# Patient Record
Sex: FEMALE | Race: WHITE | NOT HISPANIC OR LATINO | URBAN - METROPOLITAN AREA
[De-identification: names, ages, dates, MRNs, and addresses within clinical notes are randomized per-mention and may not be internally consistent; named-entity substitution may affect disease eponyms.]

---

## 2021-06-10 PROBLEM — N94.6 DYSMENORRHEA: Status: ACTIVE | Noted: 2021-06-10

## 2021-06-10 PROBLEM — N84.0 ENDOMETRIAL POLYP: Status: ACTIVE | Noted: 2021-06-10

## 2021-06-10 PROBLEM — Z80.3 FAMILY HISTORY OF BREAST CANCER: Status: ACTIVE | Noted: 2021-06-10

## 2021-06-10 PROBLEM — N92.0 MENORRHAGIA: Status: ACTIVE | Noted: 2021-06-10

## 2021-09-22 ENCOUNTER — VBI (OUTPATIENT)
Dept: ADMINISTRATIVE | Facility: OTHER | Age: 41
End: 2021-09-22

## 2021-09-22 NOTE — TELEPHONE ENCOUNTER
Upon review of the In Basket request we were able to locate, review, and update the patient chart as requested for Mammogram     Any additional questions or concerns should be emailed to the Practice Liaisons via [email or web address] email, please do not reply via In Basket      Thank you  Jeremi Kennedy

## 2023-10-26 PROBLEM — Z01.419 ENCOUNTER FOR GYNECOLOGICAL EXAMINATION (GENERAL) (ROUTINE) WITHOUT ABNORMAL FINDINGS: Status: ACTIVE | Noted: 2023-10-26

## 2023-10-26 NOTE — PROGRESS NOTES
Assessment/Plan:    Encounter for gynecological examination (general) (routine) without abnormal findings  Here for well check, no complaints. Last seen 2021. Lost 50 lbs with diet and exercise, feels great. Same partner x 15 yrs. Cycles for 7 days, 2 days heavy/dysmenorrhea. No further IMB. Normal breast and pelvic exams. Last pap 10/2020 neg/HPV neg; repeated today  Mammo order given, last 7/3/23 reviewed on [employer]  Will RTO 6 months for breast exam.   Referral given for f/h breast cancer in mother @35. Endometrial polyp  Dx 2020 on u/s after IMB. Decided not to have D&C. Since that time no further bleeding issues. Declines follow up at this time. Diagnoses and all orders for this visit:    Encounter for gynecological examination (general) (routine) without abnormal findings    Family history of breast cancer  -     Mammo screening bilateral w 3d & cad; Future  -     Ambulatory Referral to Oncology Genetics; Future    Endometrial polyp    Encounter for screening mammogram for malignant neoplasm of breast  -     Mammo screening bilateral w 3d & cad; Future          Subjective:      Patient ID: Molly Floyd is a 37 y.o. female. HPI Here for well check. The following portions of the patient's history were reviewed and updated as appropriate: She  has a past medical history of Anxiety, Depression, Genital warts (2010), History of pelvic ultrasound (12/08/2020), History of screening mammography (08/2023), HPV in female (2010), Papanicolaou smear (10/29/2020), and Seasonal allergies. She   Patient Active Problem List    Diagnosis Date Noted    Encounter for gynecological examination (general) (routine) without abnormal findings 10/26/2023    Endometrial polyp 06/10/2021    Family history of breast cancer 06/10/2021     She  has a past surgical history that includes Septoplasty (2008); Tonsillectomy (1988); and Mammo (historical) (07/03/2023).   Her family history includes Breast cancer in her mother; Lung cancer in her maternal grandfather; No Known Problems in her maternal aunt, maternal aunt, maternal grandmother, maternal uncle, and maternal uncle. She  reports that she has never smoked. She has never used smokeless tobacco. She reports current alcohol use. She reports that she does not currently use drugs. No current outpatient medications on file. No current facility-administered medications for this visit. She has No Known Allergies. .    Review of Systems  No breast, bladder, bowel changes.  No new persistent pain, bloating, early satiety or pelvic pressure  No significant menorrhagia, dysmenorrhea, no intermenstrual bleeding      Objective:      /64   Ht 5' 10.75" (1.797 m)   Wt 77.6 kg (171 lb)   LMP 10/20/2023 (Exact Date)   Breastfeeding No   BMI 24.02 kg/m²          Physical Exam    General appearance: no distress, pleasant  Neck: thyroid without nodules or thyromegaly, no palpable adenopathy  Lymph nodes: no palpable adenopathy  Breasts: no masses, nodes or skin changes, dense islands bilaterally  Abdomen: soft, non tender, no palpable masses  Pelvic exam: normal external genitalia, urethral meatus normal, vagina without lesions, cervix without lesions, RV uterus small, non tender, no adnexal masses, non tender  Rectal exam: deferred

## 2023-10-31 ENCOUNTER — ANNUAL EXAM (OUTPATIENT)
Dept: OBGYN CLINIC | Facility: CLINIC | Age: 43
End: 2023-10-31

## 2023-10-31 VITALS
DIASTOLIC BLOOD PRESSURE: 64 MMHG | WEIGHT: 171 LBS | HEIGHT: 71 IN | SYSTOLIC BLOOD PRESSURE: 118 MMHG | BODY MASS INDEX: 23.94 KG/M2

## 2023-10-31 DIAGNOSIS — Z12.4 SCREENING FOR MALIGNANT NEOPLASM OF THE CERVIX: ICD-10-CM

## 2023-10-31 DIAGNOSIS — Z12.31 ENCOUNTER FOR SCREENING MAMMOGRAM FOR MALIGNANT NEOPLASM OF BREAST: ICD-10-CM

## 2023-10-31 DIAGNOSIS — Z80.3 FAMILY HISTORY OF BREAST CANCER: ICD-10-CM

## 2023-10-31 DIAGNOSIS — N84.0 ENDOMETRIAL POLYP: ICD-10-CM

## 2023-10-31 DIAGNOSIS — Z01.419 ENCOUNTER FOR GYNECOLOGICAL EXAMINATION (GENERAL) (ROUTINE) WITHOUT ABNORMAL FINDINGS: Primary | ICD-10-CM

## 2023-10-31 PROBLEM — N94.6 DYSMENORRHEA: Status: RESOLVED | Noted: 2021-06-10 | Resolved: 2023-10-31

## 2023-10-31 PROBLEM — N92.0 MENORRHAGIA: Status: RESOLVED | Noted: 2021-06-10 | Resolved: 2023-10-31

## 2023-10-31 NOTE — PATIENT INSTRUCTIONS
Call for your appointment with genetics at [phone]. Return to office in six months unless having any problems such as breast changes, bleeding, new persistent pain, new progressive bloating, new problems eating (getting full to quickly) or new constant urinary pressure that does not resolve in one week.

## 2023-10-31 NOTE — ASSESSMENT & PLAN NOTE
Here for well check, no complaints. Last seen 2021. Lost 50 lbs with diet and exercise, feels great. Same partner x 15 yrs. Cycles for 7 days, 2 days heavy/dysmenorrhea. No further IMB. Normal breast and pelvic exams. Last pap 10/2020 neg/HPV neg; repeated today  Mammo order given, last 7/3/23 reviewed on [email or web address]  Will RTO 6 months for breast exam.   Referral given for f/h breast cancer in mother @35.

## 2023-10-31 NOTE — ASSESSMENT & PLAN NOTE
Dx 2020 on u/s after IMB. Decided not to have D&C. Since that time no further bleeding issues. Declines follow up at this time.

## 2023-10-31 NOTE — LETTER
October 31, 2023     DO Leidy Judd  [address]    Patient: Keyur Ames   YOB: 1980   Date of Visit: 10/31/2023       Dear Dr. Suzanne Oh:    Althea Koyanagi was in today for her annual gyn exam.  Below are my notes for this consultation. If you have questions, please do not hesitate to call me. I look forward to following your patient along with you. Sincerely,        Mary Monroe MD        CC: No Recipients    Mary Monroe MD  10/31/2023 11:37 AM  Sign when Signing Visit  Assessment/Plan:    Encounter for gynecological examination (general) (routine) without abnormal findings  Here for well check, no complaints. Last seen 2021. Lost 50 lbs with diet and exercise, feels great. Same partner x 15 yrs. Cycles for 7 days, 2 days heavy/dysmenorrhea. No further IMB. Normal breast and pelvic exams. Last pap 10/2020 neg/HPV neg; repeated today  Mammo order given, last 7/3/23 reviewed on [employer]  Will RTO 6 months for breast exam.   Referral given for f/h breast cancer in mother @35. Endometrial polyp  Dx 2020 on u/s after IMB. Decided not to have D&C. Since that time no further bleeding issues. Declines follow up at this time. Diagnoses and all orders for this visit:    Encounter for gynecological examination (general) (routine) without abnormal findings    Family history of breast cancer  -     Mammo screening bilateral w 3d & cad; Future  -     Ambulatory Referral to Oncology Genetics; Future    Endometrial polyp    Encounter for screening mammogram for malignant neoplasm of breast  -     Mammo screening bilateral w 3d & cad; Future          Subjective:      Patient ID: Keyur Ames is a 37 y.o. female. HPI Here for well check.       The following portions of the patient's history were reviewed and updated as appropriate: She  has a past medical history of Anxiety, Depression, Genital warts (2010), History of pelvic ultrasound (12/08/2020), History of screening mammography (08/2023), HPV in female (2010), Papanicolaou smear (10/29/2020), and Seasonal allergies. She   Patient Active Problem List    Diagnosis Date Noted   • Encounter for gynecological examination (general) (routine) without abnormal findings 10/26/2023   • Endometrial polyp 06/10/2021   • Family history of breast cancer 06/10/2021     She  has a past surgical history that includes Septoplasty (2008); Tonsillectomy (1988); and Mammo (historical) (07/03/2023). Her family history includes Breast cancer in her mother; Lung cancer in her maternal grandfather; No Known Problems in her maternal aunt, maternal aunt, maternal grandmother, maternal uncle, and maternal uncle. She  reports that she has never smoked. She has never used smokeless tobacco. She reports current alcohol use. She reports that she does not currently use drugs. No current outpatient medications on file. No current facility-administered medications for this visit. She has No Known Allergies. .    Review of Systems  No breast, bladder, bowel changes.  No new persistent pain, bloating, early satiety or pelvic pressure  No significant menorrhagia, dysmenorrhea, no intermenstrual bleeding      Objective:      /64   Ht 5' 10.75" (1.797 m)   Wt 77.6 kg (171 lb)   LMP 10/20/2023 (Exact Date)   Breastfeeding No   BMI 24.02 kg/m²          Physical Exam    General appearance: no distress, pleasant  Neck: thyroid without nodules or thyromegaly, no palpable adenopathy  Lymph nodes: no palpable adenopathy  Breasts: no masses, nodes or skin changes, dense islands bilaterally  Abdomen: soft, non tender, no palpable masses  Pelvic exam: normal external genitalia, urethral meatus normal, vagina without lesions, cervix without lesions, RV uterus small, non tender, no adnexal masses, non tender  Rectal exam: deferred

## 2023-11-03 LAB
CLINICAL INFO: NORMAL
CYTO CVX: NORMAL
CYTOLOGY CMNT CVX/VAG CYTO-IMP: NORMAL
DATE PREVIOUS BX: NORMAL
HPV E6+E7 MRNA CVX QL NAA+PROBE: NOT DETECTED
LMP START DATE: NORMAL
SL AMB PREV. PAP:: NORMAL
SPECIMEN SOURCE CVX/VAG CYTO: NORMAL

## 2023-11-07 ENCOUNTER — TELEPHONE (OUTPATIENT)
Dept: HEMATOLOGY ONCOLOGY | Facility: CLINIC | Age: 43
End: 2023-11-07

## 2023-11-07 NOTE — LETTER
Sherry [address] [place] [id number]             Dear Chanda Levi,      Your health care provider referred you to the [address] [place] [address],[address] and Genetics Program.  We were unable to reach you to schedule an appointment.   If you are interested in scheduling an appointment with one of our genetic counselors please reach out to our office at [phone]    Sincerely,  Masoud Bangura

## 2023-11-07 NOTE — TELEPHONE ENCOUNTER
I called Sherley to schedule a new patient appointment with the [facility]etics [facility].      Outcome: There was no answer and I was unable to leave a voice message. Follow-up:   At this time the referral will be closed and we will wait to hear back from the patient regarding scheduling this appointment.

## 2023-12-25 PROBLEM — Z01.419 ENCOUNTER FOR GYNECOLOGICAL EXAMINATION (GENERAL) (ROUTINE) WITHOUT ABNORMAL FINDINGS: Status: RESOLVED | Noted: 2023-10-26 | Resolved: 2023-12-25

## 2024-11-20 NOTE — PROGRESS NOTES
Assessment/Plan:    Encounter for gynecological examination (general) (routine) without abnormal findings  Here for well check, in new relationship. +persistent vaginal odor x 3 months after isolated unprotected event.   No other breast or gyn concerns.   +condoms choice for contraception.     Normal breast and pelvic exams. Cultures done.  Last pap 10/2023 neg/HPV neg; due 2028  Mammo order given, last 7/3/23 BIRADS 1C, reviewed on [employer]    Vaginal odor  Since August, without other symptoms.   Normal exam, vaginitis plus cultures obtained.   Recommend d/c boric acid and await cultures. RepHresh gel if needed.   Advised to switch to cotton only underwear and to avoid pads.   Will contact with results.    Family history of breast cancer - mother @35  Discussed genetic testing.   Referral placed  No change in self breast or clinical breast exams.  Overdue for mammo, order given. Last 7/2023  RTO for six months breast surveillance.   Agrees to plan.       Diagnoses and all orders for this visit:    Encounter for gynecological examination (general) (routine) without abnormal findings    Breast cancer screening by mammogram  -     Mammo screening bilateral w 3d and cad; Future    Family history of breast cancer in mother  -     Mammo screening bilateral w 3d and cad; Future    Family history of breast cancer  -     Ambulatory Referral to Oncology Genetics; Future    Vaginal odor  -     SURESWAB(R) ADVANCED VAGINITIS PLUS, TMA    Other orders  -     Liquid-based pap, screening          Subjective:      Patient ID: Sherley Gama is a 44 y.o. female.    HPI Here for well check, c/o 3 months of vaginal odor after unprotected event. Using boric acid.    The following portions of the patient's history were reviewed and updated as appropriate: She  has a past medical history of Anxiety, Depression, Genital warts (2010), History of pelvic ultrasound (12/08/2020), History of screening mammography (08/2023), HPV in female  "(2010), Papanicolaou smear (10/29/2020), and Seasonal allergies.  She   Patient Active Problem List    Diagnosis Date Noted    Vaginal odor 11/22/2024    Endometrial polyp 06/10/2021    Family history of breast cancer - mother @35 06/10/2021     She  has a past surgical history that includes Septoplasty (2008); Tonsillectomy (1988); and Mammo (historical) (07/03/2023).  Her family history includes Breast cancer in her mother; Heart attack in her father; Lung cancer in her maternal grandfather; No Known Problems in her brother, daughter, half-brother, half-sister, maternal aunt, maternal aunt, maternal grandmother, maternal uncle, maternal uncle, and paternal grandmother; Parkinsonism in her paternal grandfather.  She  reports that she has never smoked. She has never used smokeless tobacco. She reports current alcohol use. She reports that she does not currently use drugs.  No current outpatient medications on file.     No current facility-administered medications for this visit.     She has no known allergies..    Review of Systems  No breast, bladder, bowel changes. No new persistent pain, bloating, early satiety or pelvic pressure  No menorrhagia, dysmenorrhea, intermenstrual bleeding; notes shorter cycles q 24-25 days    Objective:    /70 (BP Location: Left arm, Patient Position: Sitting, Cuff Size: Large)   Ht 5' 11\" (1.803 m)   Wt 83.4 kg (183 lb 12.8 oz)   BMI 25.63 kg/m²      Physical Exam    General appearance: no distress, pleasant  Neck: thyroid without nodules or thyromegaly, no palpable adenopathy  Lymph nodes: no palpable adenopathy  Breasts: no masses, nodes or skin changes  Abdomen: soft, non tender, no palpable masses  Pelvic exam: normal external genitalia, urethral meatus normal, vagina without abnormal discharge, cervix without lesions, clear secretions noted, uterus small, non tender, no adnexal masses, non tender  Rectal exam: no masses, non tender, RV confirms above    "

## 2024-11-22 ENCOUNTER — ANNUAL EXAM (OUTPATIENT)
Dept: OBGYN CLINIC | Facility: CLINIC | Age: 44
End: 2024-11-22
Payer: COMMERCIAL

## 2024-11-22 VITALS
DIASTOLIC BLOOD PRESSURE: 70 MMHG | SYSTOLIC BLOOD PRESSURE: 108 MMHG | WEIGHT: 183.8 LBS | BODY MASS INDEX: 25.73 KG/M2 | HEIGHT: 71 IN

## 2024-11-22 DIAGNOSIS — Z01.419 ENCOUNTER FOR GYNECOLOGICAL EXAMINATION (GENERAL) (ROUTINE) WITHOUT ABNORMAL FINDINGS: Primary | ICD-10-CM

## 2024-11-22 DIAGNOSIS — N89.8 VAGINAL ODOR: ICD-10-CM

## 2024-11-22 DIAGNOSIS — Z12.31 BREAST CANCER SCREENING BY MAMMOGRAM: ICD-10-CM

## 2024-11-22 DIAGNOSIS — Z80.3 FAMILY HISTORY OF BREAST CANCER: ICD-10-CM

## 2024-11-22 DIAGNOSIS — Z80.3 FAMILY HISTORY OF BREAST CANCER IN MOTHER: ICD-10-CM

## 2024-11-22 PROCEDURE — 99396 PREV VISIT EST AGE 40-64: CPT | Performed by: OBSTETRICS & GYNECOLOGY

## 2024-11-22 NOTE — ASSESSMENT & PLAN NOTE
Discussed genetic testing.   Referral placed  No change in self breast or clinical breast exams.  Overdue for mammo, order given. Last 7/2023  RTO for six months breast surveillance.   Agrees to plan.

## 2024-11-22 NOTE — ASSESSMENT & PLAN NOTE
Here for well check, in new relationship. +persistent vaginal odor x 3 months after isolated unprotected event.   No other breast or gyn concerns.   +condoms choice for contraception.     Normal breast and pelvic exams. Cultures done.  Last pap 10/2023 neg/HPV neg; due 2028  Mammo order given, last 7/3/23 BIRADS 1C, reviewed on [facility]

## 2024-11-22 NOTE — ASSESSMENT & PLAN NOTE
Since August, without other symptoms.   Normal exam, vaginitis plus cultures obtained.   Recommend d/c boric acid and await cultures. RepHresh gel if needed.   Advised to switch to cotton only underwear and to avoid pads.   Will contact with results.

## 2024-11-22 NOTE — PATIENT INSTRUCTIONS
Call for bleeding that occurs earlier than 21 days, lasts longer than 10 days or for bleeding between cycles.    Call the [facility] for genetic testing:  [facility]: [phone] ([phone])    RepHresh gel if needed for odor.    Return to office in six months unless having any problems such as breast changes, bleeding, new persistent pain, new progressive bloating, new problems eating (getting full to quickly) or new constant urinary pressure that does not resolve in one week.

## 2024-11-22 NOTE — LETTER
November 22, 2024     Mali Aldana, DO  [address]  [address]  [facility] [address]    Patient: Sherley Gama   YOB: 1980   Date of Visit: 11/22/2024       Dear Dr. Aldana:    Sherley Gama was in today for her annual gyn exam. Below are my notes for this visit.    If you have questions, please do not hesitate to call me. I look forward to following your patient along with you.         Sincerely,        Leonila Jiménez MD        CC: No Recipients    Leonila Jiménez MD  11/22/2024 10:30 AM  Sign when Signing Visit  Assessment/Plan:    Encounter for gynecological examination (general) (routine) without abnormal findings  Here for well check, in new relationship. +persistent vaginal odor x 3 months after isolated unprotected event.   No other breast or gyn concerns.   +condoms choice for contraception.     Normal breast and pelvic exams. Cultures done.  Last pap 10/2023 neg/HPV neg; due 2028  Mammo order given, last 7/3/23 BIRADS 1C, reviewed on [employer]    Vaginal odor  Since August, without other symptoms.   Normal exam, vaginitis plus cultures obtained.   Recommend d/c boric acid and await cultures. RepHresh gel if needed.   Advised to switch to cotton only underwear and to avoid pads.   Will contact with results.    Family history of breast cancer - mother @35  Discussed genetic testing.   Referral placed  No change in self breast or clinical breast exams.  Overdue for mammo, order given. Last 7/2023  RTO for six months breast surveillance.   Agrees to plan.       Diagnoses and all orders for this visit:    Encounter for gynecological examination (general) (routine) without abnormal findings    Breast cancer screening by mammogram  -     Mammo screening bilateral w 3d and cad; Future    Family history of breast cancer in mother  -     Mammo screening bilateral w 3d and cad; Future    Family history of breast cancer  -     Ambulatory Referral to Oncology Genetics; Future    Vaginal odor  -      "SURESWAB(R) ADVANCED VAGINITIS PLUS, TMA    Other orders  -     Liquid-based pap, screening          Subjective:      Patient ID: Sherley Gama is a 44 y.o. female.    HPI Here for well check, c/o 3 months of vaginal odor after unprotected event. Using boric acid.    The following portions of the patient's history were reviewed and updated as appropriate: She  has a past medical history of Anxiety, Depression, Genital warts (2010), History of pelvic ultrasound (12/08/2020), History of screening mammography (08/2023), HPV in female (2010), Papanicolaou smear (10/29/2020), and Seasonal allergies.  She   Patient Active Problem List    Diagnosis Date Noted   • Vaginal odor 11/22/2024   • Endometrial polyp 06/10/2021   • Family history of breast cancer - mother @35 06/10/2021     She  has a past surgical history that includes Septoplasty (2008); Tonsillectomy (1988); and Mammo (historical) (07/03/2023).  Her family history includes Breast cancer in her mother; Heart attack in her father; Lung cancer in her maternal grandfather; No Known Problems in her brother, daughter, half-brother, half-sister, maternal aunt, maternal aunt, maternal grandmother, maternal uncle, maternal uncle, and paternal grandmother; Parkinsonism in her paternal grandfather.  She  reports that she has never smoked. She has never used smokeless tobacco. She reports current alcohol use. She reports that she does not currently use drugs.  No current outpatient medications on file.     No current facility-administered medications for this visit.     She has no known allergies..    Review of Systems  No breast, bladder, bowel changes. No new persistent pain, bloating, early satiety or pelvic pressure  No menorrhagia, dysmenorrhea, intermenstrual bleeding; notes shorter cycles q 24-25 days    Objective:    /70 (BP Location: Left arm, Patient Position: Sitting, Cuff Size: Large)   Ht 5' 11\" (1.803 m)   Wt 83.4 kg (183 lb 12.8 oz)   BMI 25.63 " kg/m²      Physical Exam    General appearance: no distress, pleasant  Neck: thyroid without nodules or thyromegaly, no palpable adenopathy  Lymph nodes: no palpable adenopathy  Breasts: no masses, nodes or skin changes  Abdomen: soft, non tender, no palpable masses  Pelvic exam: normal external genitalia, urethral meatus normal, vagina without abnormal discharge, cervix without lesions, clear secretions noted, uterus small, non tender, no adnexal masses, non tender  Rectal exam: no masses, non tender, RV confirms above

## 2024-11-23 LAB
BV BACTERIA RRNA VAG QL NAA+PROBE: POSITIVE
C GLABRATA RNA VAG QL NAA+PROBE: NOT DETECTED
C TRACH RRNA SPEC QL NAA+PROBE: NOT DETECTED
CANDIDA RRNA VAG QL PROBE: NOT DETECTED
N GONORRHOEA RRNA SPEC QL NAA+PROBE: NOT DETECTED
T VAGINALIS RRNA SPEC QL NAA+PROBE: NOT DETECTED

## 2024-11-25 ENCOUNTER — RESULTS FOLLOW-UP (OUTPATIENT)
Dept: OBGYN CLINIC | Facility: CLINIC | Age: 44
End: 2024-11-25

## 2024-11-25 DIAGNOSIS — B96.89 BV (BACTERIAL VAGINOSIS): Primary | ICD-10-CM

## 2024-11-25 DIAGNOSIS — N76.0 BV (BACTERIAL VAGINOSIS): Primary | ICD-10-CM

## 2024-11-25 RX ORDER — METRONIDAZOLE 7.5 MG/G
1 GEL VAGINAL
Qty: 70 G | Refills: 0 | Status: SHIPPED | OUTPATIENT
Start: 2024-11-25 | End: 2024-11-30

## 2024-11-25 NOTE — TELEPHONE ENCOUNTER
Please contact with cultures showing BV (negative for yeast, STI, trich).   Metrogel rx sent to pharmacy to use nightly for 5 nights.   Thanks.

## 2024-11-25 NOTE — TELEPHONE ENCOUNTER
FYI - Patient returned call regarding lab results. Read Dr Jiménez's interpretation verbatim. Pt verbalized understanding, aware of prescription at pharmacy & had no further questions.   Please close result note

## 2025-07-01 ENCOUNTER — NURSE TRIAGE (OUTPATIENT)
Age: 45
End: 2025-07-01

## 2025-07-01 NOTE — TELEPHONE ENCOUNTER
"LMP 6/4-6/10.  She started with bleeding again 6/20.  6/21 went to ED for heavy bleeding with clots. ED put her on combo OCP.  Her period then started 7/1.      REASON FOR CONVERSATION: Vaginal Problem    SYMPTOMS: Vaginal odor has been ongoing.  Normally has it before and after her period.  She started using boric acid suppositories.  She advised she had normal period 6/4-6/10, then after using suppositories, she started bleeding again 6/20.  She went to the ED 6/21 and was placed on OCP to stop the heavy bleeding.  She was changing ultra tampon every hour with large clots.  She advised it stopped 6/25.  She started with normal period today.  She also notes continued odor. Denies discharge, irritation or itching.    OTHER HEALTH INFORMATION: None    PROTOCOL DISPOSITION: Warm Transfer to Office Clnical Pool (GYN/ONC) For Urgent Follow-Up (overriding See Within 3 Days in Office)    CARE ADVICE PROVIDED: warm transfer to  for assistance.     PRACTICE FOLLOW-UP: None        Reason for Disposition   Vaginal odor (bad smell) not improved > 3 days following Care Advice    Answer Assessment - Initial Assessment Questions  1. SYMPTOM: \"What's the main symptom you're concerned about?\" (e.g., pain, itching, dryness)      Vaginal odor  3. ONSET: \"When did this  start?\"      Has been ongoing  4. PAIN: \"Is there any pain?\" If Yes, ask: \"How bad is it?\" (Scale: 1-10; mild, moderate, severe)      Normal cramping  5. ITCHING: \"Is there any itching?\" If Yes, ask: \"How bad is it?\" (Scale: 1-10; mild, moderate, severe)      denies  6. CAUSE: \"What do you think is causing the discharge?\" \"Have you had the same problem before?\" \"What happened then?\"      unsure  7. OTHER SYMPTOMS: \"Do you have any other symptoms?\" (e.g., fever, itching, vaginal bleeding, pain with urination, injury to genital area, vaginal foreign body)      Was using boric acid suppositories, started having heavy bleeding 10 days after period.  8. PREGNANCY: " "\"Is there any chance you are pregnant?\" \"When was your last menstrual period?\"      7/1    Protocols used: Vaginal Symptoms-Adult-[place]    "

## 2025-07-09 ENCOUNTER — OFFICE VISIT (OUTPATIENT)
Dept: OBGYN CLINIC | Facility: CLINIC | Age: 45
End: 2025-07-09
Payer: COMMERCIAL

## 2025-07-09 VITALS
WEIGHT: 187 LBS | BODY MASS INDEX: 26.18 KG/M2 | SYSTOLIC BLOOD PRESSURE: 120 MMHG | HEIGHT: 71 IN | DIASTOLIC BLOOD PRESSURE: 78 MMHG

## 2025-07-09 DIAGNOSIS — N84.0 ENDOMETRIAL POLYP: ICD-10-CM

## 2025-07-09 DIAGNOSIS — R93.89 THICKENED ENDOMETRIUM: ICD-10-CM

## 2025-07-09 DIAGNOSIS — N92.1 MENOMETRORRHAGIA: ICD-10-CM

## 2025-07-09 DIAGNOSIS — N89.8 VAGINAL ODOR: Primary | ICD-10-CM

## 2025-07-09 DIAGNOSIS — Z80.3 FAMILY HISTORY OF BREAST CANCER: ICD-10-CM

## 2025-07-09 PROCEDURE — 99215 OFFICE O/P EST HI 40 MIN: CPT | Performed by: OBSTETRICS & GYNECOLOGY

## 2025-07-09 RX ORDER — DIPHENOXYLATE HYDROCHLORIDE AND ATROPINE SULFATE 2.5; .025 MG/1; MG/1
1 TABLET ORAL DAILY
COMMUNITY

## 2025-07-09 RX ORDER — MAGNESIUM L-LACTATE 84 MG
84 TABLET, EXTENDED RELEASE ORAL DAILY
COMMUNITY

## 2025-07-09 NOTE — ASSESSMENT & PLAN NOTE
- no malodor discharge noted on exam       -  Wet [place] findings negative for B.V       -  informed pt and Sureswab submitted to lab for further testing  Orders:    SURESWAB(R) ADVANCED VAGINITIS PLUS, TMA

## 2025-07-09 NOTE — ASSESSMENT & PLAN NOTE
" -  Pt states the U/S tech had recommended EMBx.    -12/2020 ultrasound uterus RV 7.3 cm with thickend EMS and 4 polyps noted on SHG largest 1.6 cm. Normal ovaries. Pt opted for surveillance, declined D&C 12/2020    - results of U/S from 2020 and findings of \"thickened endometrium\" of most recent U/S (June 2025) reviewed with patient   - Informed pt EMBx can be done, however, D&C polypectomy is indicated for excision of endometrial polyps  "

## 2025-07-09 NOTE — PROGRESS NOTES
"[facility] [place]  [address], [address], [place] [address]    Assessment & Plan  Vaginal odor       - no malodor discharge noted on exam       -  [place] findings negative for B.V       -  informed pt and Sureswab submitted to lab for further testing  Orders:    SURESWAB(R) ADVANCED VAGINITIS PLUS, TMA    Menometrorrhagia    -  Pt's history of thickened endometrium and SHG in 2020 being significant for four (4) endometrial polyps reviewed with patient.    -  Pt's history of refusal to proceed with D&C, hysteroscopy, recommended by Dr Jiménez, reviewed with patient     -  management of menses with estrogen-progesterone options reviewed with patient     -  pt declined any medical management stating her bleeding was normal with her previous menses after ER visit       Thickened endometrium  -  Pt states the [employer] had recommended EMBx.    -12/2020 ultrasound uterus RV 7.3 cm with thickend EMS and 4 polyps noted on SHG largest 1.6 cm. Normal ovaries. Pt opted for surveillance, declined D&C 12/2020    - results of U/S from 2020 and findings of \"thickened endometrium\" of most recent U/S (June 2025) reviewed with patient   - Informed pt EMBx can be done, however, D&C polypectomy is indicated for excision of endometrial polyps       Family history of breast cancer - mother @35    - Pt expressed concern about her mom having had breast cancer and Dad having had M.I., stating she wants to be proactive about her health, hence, wants EMBx    -  Advised the  to schedule pt for EMBx    -  Informed pt EMBx will not excise endometrial polyps       Endometrial polyp   -  Pt states the [employer] had recommended EMBx.    -12/2020 ultrasound uterus RV 7.3 cm with thickend EMS and 4 polyps noted on SHG largest 1.6 cm. Normal ovaries. Pt opted for surveillance, declined D&C 12/2020    - results of U/S from 2020 and findings of \"thickened endometrium\" of most recent U/S (June 2025) reviewed with " "patient   - Informed pt EMBx can be done, however, D&C polypectomy is indicated for excision of endometrial polyps    I have spent a total time of 40 minutes in caring for this patient on the day of the visit/encounter including Diagnostic results, Prognosis, Risks and benefits of tx options, Instructions for management, Patient and family education, Importance of tx compliance, Risk factor reductions, Impressions, Counseling / Coordination of care, Documenting in the medical record, Reviewing/placing orders in the medical record (including tests, medications, and/or procedures), and Obtaining or reviewing history  .      Subjective:   Sherley Gama is a 44 y.o.  female.    HPI:   Pt presents with concern about malodor vaginal discharge.  She is also concerned about \"Thick Endometrium\", that [email or web address] informed her of, when she presented to [facility] ER on 25 for heavy vaginal bleeding.  During ER visit, pt was provided Rx for OCPs which patient discontinued after her vaginal bleeding stopped,  Pt then had vaginal bleeding again which she states is her regular menstrual bleeding        Gyn History  Patient's last menstrual period was 2025 (exact date).       Last pap smear: 10/31/2023    She  reports being sexually active and has had partner(s) who are male. She reports using the following method of birth control/protection: Condom Male.       OB History      Past Medical History:  No date: Anxiety  No date: Depression  2010: Genital warts  2020: History of pelvic ultrasound      Comment:  Ut RV 7.3 cm with thickend EMS and 4 polyps noted on SHG               largest 1.6 cm. Normal ovaries  2023: History of screening mammography      Comment:  Negative per pt. Done at [facility].  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    ROS: Review of Systems   Constitutional:  Negative for activity change and unexpected weight change.   Genitourinary:  Positive for menstrual problem and vaginal discharge. Negative for pelvic pain, vaginal bleeding and vaginal pain.       Objective:  /78 (BP Location: Left arm, Patient Position: Sitting, Cuff Size: Standard)   Ht 5' 11\" (1.803 m)   Wt 84.8 kg (187 lb)   LMP 07/01/2025 (Exact Date)   BMI 26.08 kg/m²      Physical Exam  Vitals and nursing note reviewed.   HENT:      Head: Normocephalic.   Abdominal:      General: There is no distension.      Palpations: Abdomen is soft. There is no mass.      Tenderness: There is no abdominal tenderness. There is no rebound.   Genitourinary:     General: Normal vulva.      Exam position: Lithotomy position.      Labia:         Right: No rash, tenderness or lesion.         Left: No rash, tenderness or lesion.       Urethra: No urethral pain or urethral lesion.      Vagina: Normal. No vaginal discharge.      Cervix: No cervical motion tenderness, lesion or erythema.      Uterus: Normal.       Adnexa: Right adnexa normal and left adnexa normal.      Rectum: No external hemorrhoid.     Musculoskeletal:      Right lower leg: No edema.      Left lower leg: No edema.     Skin:     General: Skin is warm.     Neurological:      Mental Status: She is alert and oriented to person, place, and time.     Psychiatric:         Mood and Affect: Mood normal.         Behavior: Behavior normal.         Thought Content: Thought content normal.                [1]   Social History  Tobacco Use    Smoking status: Never    Smokeless tobacco: Never   Vaping Use    Vaping status: Never Used   Substance Use Topics    Alcohol use: Yes     Comment: social    Drug use: Not Currently   [2]   Current Outpatient Medications:     magnesium (MAGTAB) 84 MG (7MEQ) TBCR, Take 84 mg by mouth daily, Disp: , Rfl:    "  multivitamin (THERAGRAN) TABS, Take 1 tablet by mouth daily, Disp: , Rfl:     Omega 3-6-9 Fatty Acids (OMEGA 3-6-9 PO), Take by mouth, Disp: , Rfl:

## 2025-07-09 NOTE — ASSESSMENT & PLAN NOTE
- Pt expressed concern about her mom having had breast cancer and Dad having had M.I., stating she wants to be proactive about her health, hence, wants EMBx    -  Advised the  to schedule pt for EMBx    -  Informed pt EMBx will not excise endometrial polyps

## 2025-07-14 DIAGNOSIS — B96.89 BV (BACTERIAL VAGINOSIS): Primary | ICD-10-CM

## 2025-07-14 DIAGNOSIS — N76.0 BV (BACTERIAL VAGINOSIS): Primary | ICD-10-CM

## 2025-07-14 RX ORDER — METRONIDAZOLE 500 MG/1
500 TABLET ORAL EVERY 12 HOURS SCHEDULED
Qty: 14 TABLET | Refills: 0 | Status: SHIPPED | OUTPATIENT
Start: 2025-07-14 | End: 2025-07-16 | Stop reason: CLARIF

## 2025-07-15 ENCOUNTER — TELEPHONE (OUTPATIENT)
Age: 45
End: 2025-07-15

## 2025-07-16 DIAGNOSIS — N76.0 BV (BACTERIAL VAGINOSIS): Primary | ICD-10-CM

## 2025-07-16 DIAGNOSIS — B96.89 BV (BACTERIAL VAGINOSIS): Primary | ICD-10-CM

## 2025-07-16 RX ORDER — METRONIDAZOLE 7.5 MG/G
GEL VAGINAL
Qty: 70 G | Refills: 0 | Status: SHIPPED | OUTPATIENT
Start: 2025-07-16